# Patient Record
Sex: FEMALE | Race: WHITE | NOT HISPANIC OR LATINO | URBAN - METROPOLITAN AREA
[De-identification: names, ages, dates, MRNs, and addresses within clinical notes are randomized per-mention and may not be internally consistent; named-entity substitution may affect disease eponyms.]

---

## 2021-09-21 ENCOUNTER — IMPORTED ENCOUNTER (OUTPATIENT)
Dept: URBAN - METROPOLITAN AREA CLINIC 31 | Facility: CLINIC | Age: 86
End: 2021-09-21

## 2021-09-21 PROBLEM — H34.12: Noted: 2021-09-21

## 2021-09-21 PROCEDURE — 99204 OFFICE O/P NEW MOD 45 MIN: CPT

## 2021-09-21 NOTE — PATIENT DISCUSSION
TOYO OS - 2 WEEKS AGO IN OTHERWISE ASYMPTOMATIC 80YEAR OLD. Pod [facility] 1677 RECORD ( I SAW HER INPATIENT AND ORDERED esr and crp)START Prednisone 60 mg daily. Will get another ESR and CRPF/u 1 week.  consider TA biopsy

## 2021-12-03 ENCOUNTER — IMPORTED ENCOUNTER (OUTPATIENT)
Dept: URBAN - METROPOLITAN AREA CLINIC 31 | Facility: CLINIC | Age: 86
End: 2021-12-03

## 2021-12-03 PROBLEM — H34.12: Noted: 2021-12-03

## 2021-12-03 PROBLEM — H40.011: Noted: 2021-12-03

## 2021-12-03 PROCEDURE — 92083 EXTENDED VISUAL FIELD XM: CPT

## 2021-12-03 PROCEDURE — 99214 OFFICE O/P EST MOD 30 MIN: CPT

## 2021-12-03 NOTE — PATIENT DISCUSSION
1.  Glaucoma suspect OD - No signs of glaucoma starting based on todays examination and testing. Will continue to monitor for development. Start Latanoprost OD qhsF/u 6 months VF test2. CRAO OS - 2 WEEKS AGO IN OTHERWISE ASYMPTOMATIC 80YEAR OLD. Wilbur Pires 1677 RECORD ( I SAW HER INPATIENT AND ORDERED esr and crp)Second set of Labs after admission showed slightly elevated CRP but normal ESR. No systemic symptoms. GCA is not very likely but will get another ESR and CRP  IF elevated - consider PO steroids and TA biopsyReturn for an appointment in 6 months for pressure check. VF 24-2. VF OD Only with Dr. Pat Hamm.

## 2022-04-02 ASSESSMENT — VISUAL ACUITY
OD_PH: SC 20/25
OD_SC: 20/20-1
OD_SC: 20/20
OS_SC: J516''
OD_CC: 20/70-1
OS_CC: J1+16''

## 2022-04-02 ASSESSMENT — TONOMETRY
OS_IOP_MMHG: 12
OD_IOP_MMHG: 12
OD_IOP_MMHG: 18
OS_IOP_MMHG: 18

## 2022-06-03 ENCOUNTER — FOLLOW UP (OUTPATIENT)
Dept: URBAN - METROPOLITAN AREA CLINIC 31 | Facility: CLINIC | Age: 87
End: 2022-06-03

## 2022-06-03 ENCOUNTER — PREPPED CHART (OUTPATIENT)
Dept: URBAN - METROPOLITAN AREA CLINIC 31 | Facility: CLINIC | Age: 87
End: 2022-06-03

## 2022-06-03 DIAGNOSIS — H34.12: ICD-10-CM

## 2022-06-03 PROCEDURE — 92083 EXTENDED VISUAL FIELD XM: CPT

## 2022-06-03 PROCEDURE — 99213 OFFICE O/P EST LOW 20 MIN: CPT

## 2022-06-03 ASSESSMENT — VISUAL ACUITY: OD_CC: 20/20

## 2022-06-03 ASSESSMENT — TONOMETRY
OD_IOP_MMHG: 11
OS_IOP_MMHG: 8

## 2022-06-03 NOTE — PATIENT DISCUSSION
1.  Glaucoma suspect OD - No signs of glaucoma starting based on todays examination and testing. Will continue to monitor for development. Start Latanoprost OD qhsF/u 6 months VF test2. CRAO OS - 2 WEEKS AGO IN OTHERWISE ASYMPTOMATIC 80YEAR OLD. Pod Lexis 1677 RECORD ( I SAW HER INPATIENT AND ORDERED esr and crp)Second set of Labs after admission showed slightly elevated CRP but normal ESR. No systemic symptoms. GCA is not very likely but will get another ESR and CRP  IF elevated - consider PO steroids and TA biopsyReturn for an appointment in 6 months for pressure check. VF 24-2. VF OD Only with Dr. Jewel García.

## 2022-06-03 NOTE — PATIENT DISCUSSION
TOYO OS - 2 WEEKS AGO IN OTHERWISE ASYMPTOMATIC 80YEAR OLD. Pod Lexis 1677 RECORD ( I SAW HER INPATIENT AND ORDERED esr and crp)START Prednisone 60 mg daily. Will get another ESR and CRPF/u 1 week. consider TA biopsy.

## 2022-06-03 NOTE — PATIENT DISCUSSION
1.  Glaucoma suspect OD - No signs of glaucoma starting based on todays examination and testing. Will continue to monitor for development. Start Latanoprost OD qhsF/u 6 months VF test2. CRAO OS - 2 WEEKS AGO IN OTHERWISE ASYMPTOMATIC 80YEAR OLD. Wilbur Pires 1677 RECORD ( I SAW HER INPATIENT AND ORDERED esr and crp)Second set of Labs after admission showed slightly elevated CRP but normal ESR. No systemic symptoms. GCA is not very likely but will get another ESR and CRP  IF elevated - consider PO steroids and TA biopsyReturn for an appointment in 6 months for pressure check. VF 24-2. VF OD Only with Dr. Luz Granger.

## 2022-07-30 ENCOUNTER — TELEPHONE ENCOUNTER (OUTPATIENT)
Age: 87
End: 2022-07-30

## 2022-07-31 ENCOUNTER — TELEPHONE ENCOUNTER (OUTPATIENT)
Age: 87
End: 2022-07-31

## 2022-10-20 ENCOUNTER — COMPREHENSIVE EXAM (OUTPATIENT)
Dept: URBAN - METROPOLITAN AREA CLINIC 29 | Facility: CLINIC | Age: 87
End: 2022-10-20

## 2022-10-20 DIAGNOSIS — H40.011: ICD-10-CM

## 2022-10-20 DIAGNOSIS — Z96.1: ICD-10-CM

## 2022-10-20 DIAGNOSIS — H34.12: ICD-10-CM

## 2022-10-20 DIAGNOSIS — H35.30: ICD-10-CM

## 2022-10-20 PROCEDURE — 92014 COMPRE OPH EXAM EST PT 1/>: CPT

## 2022-10-20 ASSESSMENT — TONOMETRY
OD_IOP_MMHG: 12
OS_IOP_MMHG: 12

## 2022-10-20 ASSESSMENT — VISUAL ACUITY: OD_CC: 20/25+1

## 2023-04-14 ENCOUNTER — FOLLOW UP (OUTPATIENT)
Dept: URBAN - METROPOLITAN AREA CLINIC 31 | Facility: CLINIC | Age: 88
End: 2023-04-14

## 2023-04-14 DIAGNOSIS — H40.011: ICD-10-CM

## 2023-04-14 DIAGNOSIS — Z96.1: ICD-10-CM

## 2023-04-14 DIAGNOSIS — H34.12: ICD-10-CM

## 2023-04-14 DIAGNOSIS — H35.30: ICD-10-CM

## 2023-04-14 PROCEDURE — 92012 INTRM OPH EXAM EST PATIENT: CPT

## 2023-04-14 PROCEDURE — 92083 EXTENDED VISUAL FIELD XM: CPT

## 2023-04-14 ASSESSMENT — TONOMETRY
OS_IOP_MMHG: 13
OD_IOP_MMHG: 12

## 2023-04-14 ASSESSMENT — VISUAL ACUITY: OD_CC: 20/20

## 2023-10-13 ENCOUNTER — COMPREHENSIVE EXAM (OUTPATIENT)
Dept: URBAN - METROPOLITAN AREA CLINIC 29 | Facility: CLINIC | Age: 88
End: 2023-10-13

## 2023-10-13 DIAGNOSIS — H35.30: ICD-10-CM

## 2023-10-13 DIAGNOSIS — H40.011: ICD-10-CM

## 2023-10-13 DIAGNOSIS — H34.12: ICD-10-CM

## 2023-10-13 DIAGNOSIS — Z96.1: ICD-10-CM

## 2023-10-13 PROCEDURE — 99213 OFFICE O/P EST LOW 20 MIN: CPT

## 2023-10-13 ASSESSMENT — TONOMETRY
OD_IOP_MMHG: 13
OS_IOP_MMHG: 13

## 2023-10-13 ASSESSMENT — VISUAL ACUITY: OD_SC: 20/20

## 2024-04-12 ENCOUNTER — FOLLOW UP (OUTPATIENT)
Dept: URBAN - METROPOLITAN AREA CLINIC 29 | Facility: CLINIC | Age: 89
End: 2024-04-12

## 2024-04-12 DIAGNOSIS — H40.011: ICD-10-CM

## 2024-04-12 PROCEDURE — 92083 EXTENDED VISUAL FIELD XM: CPT

## 2024-04-12 PROCEDURE — 99213 OFFICE O/P EST LOW 20 MIN: CPT

## 2024-04-12 ASSESSMENT — TONOMETRY
OS_IOP_MMHG: 17
OD_IOP_MMHG: 14

## 2024-04-12 ASSESSMENT — VISUAL ACUITY: OD_CC: 20/30-2

## 2024-10-16 ENCOUNTER — COMPREHENSIVE EXAM (OUTPATIENT)
Dept: URBAN - METROPOLITAN AREA CLINIC 29 | Facility: CLINIC | Age: 89
End: 2024-10-16

## 2024-10-16 DIAGNOSIS — H40.1112: ICD-10-CM

## 2024-10-16 DIAGNOSIS — H34.12: ICD-10-CM

## 2024-10-16 DIAGNOSIS — H40.1123: ICD-10-CM

## 2024-10-16 PROCEDURE — 92133 CPTRZD OPH DX IMG PST SGM ON: CPT

## 2024-10-16 PROCEDURE — 99214 OFFICE O/P EST MOD 30 MIN: CPT

## 2025-04-22 ENCOUNTER — FOLLOW UP (OUTPATIENT)
Age: OVER 89
End: 2025-04-22

## 2025-04-22 DIAGNOSIS — H40.1123: ICD-10-CM

## 2025-04-22 DIAGNOSIS — H34.12: ICD-10-CM

## 2025-04-22 DIAGNOSIS — H40.1112: ICD-10-CM

## 2025-04-22 PROCEDURE — 92083 EXTENDED VISUAL FIELD XM: CPT

## 2025-04-22 PROCEDURE — 99213 OFFICE O/P EST LOW 20 MIN: CPT
